# Patient Record
Sex: FEMALE | Race: WHITE | NOT HISPANIC OR LATINO | URBAN - METROPOLITAN AREA
[De-identification: names, ages, dates, MRNs, and addresses within clinical notes are randomized per-mention and may not be internally consistent; named-entity substitution may affect disease eponyms.]

---

## 2022-06-22 ENCOUNTER — OFFICE VISIT (OUTPATIENT)
Dept: URBAN - METROPOLITAN AREA CLINIC 98 | Facility: CLINIC | Age: 57
End: 2022-06-22
Payer: MEDICARE

## 2022-06-22 ENCOUNTER — WEB ENCOUNTER (OUTPATIENT)
Dept: URBAN - METROPOLITAN AREA CLINIC 98 | Facility: CLINIC | Age: 57
End: 2022-06-22

## 2022-06-22 VITALS
BODY MASS INDEX: 35.17 KG/M2 | HEIGHT: 64 IN | TEMPERATURE: 97.8 F | SYSTOLIC BLOOD PRESSURE: 155 MMHG | DIASTOLIC BLOOD PRESSURE: 83 MMHG | WEIGHT: 206 LBS | HEART RATE: 62 BPM

## 2022-06-22 DIAGNOSIS — K21.9 GERD WITHOUT ESOPHAGITIS: ICD-10-CM

## 2022-06-22 DIAGNOSIS — E66.9 OBESITY (BMI 30-39.9): ICD-10-CM

## 2022-06-22 DIAGNOSIS — R10.32 LLQ ABDOMINAL PAIN: ICD-10-CM

## 2022-06-22 PROCEDURE — 99203 OFFICE O/P NEW LOW 30 MIN: CPT | Performed by: INTERNAL MEDICINE

## 2022-06-22 PROCEDURE — 99243 OFF/OP CNSLTJ NEW/EST LOW 30: CPT | Performed by: INTERNAL MEDICINE

## 2022-06-22 RX ORDER — CARVEDILOL 25 MG/1
TABLET, FILM COATED ORAL
Qty: 0 | Refills: 0 | Status: ACTIVE | COMMUNITY
Start: 2016-07-25

## 2022-06-22 RX ORDER — ESTRADIOL 1 MG/1
TABLET ORAL
Qty: 0 | Refills: 0 | Status: ON HOLD | COMMUNITY
Start: 2016-08-04

## 2022-06-22 RX ORDER — HYDROCODONE BITARTRATE AND ACETAMINOPHEN 5; 325 MG/1; MG/1
TABLET ORAL
Qty: 0 | Refills: 0 | Status: ON HOLD | COMMUNITY
Start: 2016-08-30

## 2022-06-22 RX ORDER — DICYCLOMINE HYDROCHLORIDE 20 MG/1
1 TABLET TABLET ORAL TWICE A DAY
Qty: 30 | Refills: 3 | OUTPATIENT
Start: 2022-06-22 | End: 2022-08-17

## 2022-06-22 RX ORDER — LEVOTHYROXINE SODIUM 150 UG/1
TABLET ORAL
Qty: 0 | Refills: 0 | Status: ACTIVE | COMMUNITY
Start: 2016-07-25

## 2022-06-22 RX ORDER — PROMETHAZINE HYDROCHLORIDE 25 MG/1
TABLET ORAL
Qty: 0 | Refills: 0 | Status: ON HOLD | COMMUNITY
Start: 2016-08-30

## 2022-06-22 RX ORDER — AMOXICILLIN AND CLAVULANATE POTASSIUM 875; 125 MG/1; MG/1
1 TABLET TABLET, FILM COATED ORAL
Qty: 20 | Refills: 0 | OUTPATIENT
Start: 2022-06-22 | End: 2022-07-02

## 2022-06-22 RX ORDER — MIRTAZAPINE 15 MG/1
TABLET, FILM COATED ORAL
Qty: 0 | Refills: 0 | Status: ON HOLD | COMMUNITY
Start: 2016-08-21

## 2022-06-22 NOTE — HPI-TODAY'S VISIT:
Patient referred by Kraig Larson MD for evaluation of abdominal pain. Copy of this consult sent to Dr. Larson. 56 yo pt w 3 days of constant L-sided abdominal pain w radiation to the back, w alternating loose, non-bloody stools and constipation,wo fever, chills, night sweats and no melenic stools nor hematochezia. Pain is worst @ night and exacerbated by eating, unrelated to the type of food she eats. She had a formed  and small bm yesterday wo blood and some mucus. Dernies cardiorespiratory sxs. No JAD sxs. Had diverticulitis 5 yrs ago. Has known nephrolithiasis, s/p bilateral ESWL 2 mos ago. Colonoscopy 2024: normal anastomosis,  sigmoid diverticulosis and E - Hrrds.  No COVID-19 exposure and has received COVID-19 vaccine + booster. No other complaints.

## 2022-06-23 ENCOUNTER — TELEPHONE ENCOUNTER (OUTPATIENT)
Dept: URBAN - METROPOLITAN AREA CLINIC 98 | Facility: CLINIC | Age: 57
End: 2022-06-23

## 2022-06-26 PROBLEM — 266435005: Status: ACTIVE | Noted: 2022-06-22

## 2022-06-26 PROBLEM — 162864005: Status: ACTIVE | Noted: 2022-06-22

## 2022-07-01 ENCOUNTER — DASHBOARD ENCOUNTERS (OUTPATIENT)
Age: 57
End: 2022-07-01

## 2022-07-01 ENCOUNTER — OFFICE VISIT (OUTPATIENT)
Dept: URBAN - METROPOLITAN AREA TELEHEALTH 2 | Facility: TELEHEALTH | Age: 57
End: 2022-07-01
Payer: MEDICARE

## 2022-07-01 DIAGNOSIS — K52.9 CHRONIC DIARRHEA: ICD-10-CM

## 2022-07-01 DIAGNOSIS — E66.9 OBESITY (BMI 30.0-34.9): ICD-10-CM

## 2022-07-01 DIAGNOSIS — R10.32 LLQ ABDOMINAL PAIN: ICD-10-CM

## 2022-07-01 PROBLEM — 443371000124107: Status: ACTIVE | Noted: 2022-07-01

## 2022-07-01 PROCEDURE — 99214 OFFICE O/P EST MOD 30 MIN: CPT | Performed by: INTERNAL MEDICINE

## 2022-07-01 RX ORDER — AMOXICILLIN AND CLAVULANATE POTASSIUM 875; 125 MG/1; MG/1
1 TABLET TABLET, FILM COATED ORAL
Qty: 20 | Refills: 0 | Status: ACTIVE | COMMUNITY
Start: 2022-06-22 | End: 2022-07-02

## 2022-07-01 RX ORDER — HYDROCODONE BITARTRATE AND ACETAMINOPHEN 5; 325 MG/1; MG/1
TABLET ORAL
Qty: 0 | Refills: 0 | Status: ON HOLD | COMMUNITY
Start: 2016-08-30

## 2022-07-01 RX ORDER — PROMETHAZINE HYDROCHLORIDE 25 MG/1
TABLET ORAL
Qty: 0 | Refills: 0 | Status: ON HOLD | COMMUNITY
Start: 2016-08-30

## 2022-07-01 RX ORDER — LEVOTHYROXINE SODIUM 150 UG/1
TABLET ORAL
Qty: 0 | Refills: 0 | Status: ACTIVE | COMMUNITY
Start: 2016-07-25

## 2022-07-01 RX ORDER — DICYCLOMINE HYDROCHLORIDE 20 MG/1
1 TABLET TABLET ORAL TWICE A DAY
Qty: 30 | Refills: 3 | Status: ACTIVE | COMMUNITY
Start: 2022-06-22 | End: 2022-08-17

## 2022-07-01 RX ORDER — CARVEDILOL 25 MG/1
TABLET, FILM COATED ORAL
Qty: 0 | Refills: 0 | Status: ACTIVE | COMMUNITY
Start: 2016-07-25

## 2022-07-01 RX ORDER — MIRTAZAPINE 15 MG/1
TABLET, FILM COATED ORAL
Qty: 0 | Refills: 0 | Status: ON HOLD | COMMUNITY
Start: 2016-08-21

## 2022-07-01 RX ORDER — ESTRADIOL 1 MG/1
TABLET ORAL
Qty: 0 | Refills: 0 | Status: ON HOLD | COMMUNITY
Start: 2016-08-04

## 2022-07-01 NOTE — HPI-TODAY'S VISIT:
Thisis a telephone OV to which patient has agreed to. Limitations of telehealth discussed; she understands and agrees to proceed. Patient's @ home during this virtual OV. 56 yo pt for abdominal pain follow  up.  Still w persistent and constant L-sided abdominal pain w radiation to the back, w alternating loose, non-bloody stools and constipation,wo fever, chills, night sweats and no melenic stools nor hematochezia.  Diarrhea seems to predominate : 2 bm's /d watery w mucus wo tenesmus, nor urgency. Has taken Imodium bid. Pain seems to be  worst @ night and exacerbated by eating, unrelated to the type of food she eats.  A + P CT scan: liver and renal cyst, s/p CCY, s/PTAH and normal sigmoid anastomosis wo acute findings. Dernies cardiorespiratory sxs. No JAD sxs. Had diverticulitis 5 yrs ago. Has known nephrolithiasis, s/p bilateral ESWL 2 mos ago. Colonoscopy 2024: normal anastomosis,  sigmoid diverticulosis and E - Hrrds.  No COVID-19 exposure and has received COVID-19 vaccine + booster. No other complaints. Labs not done.

## 2022-11-11 ENCOUNTER — OFFICE VISIT (OUTPATIENT)
Dept: URBAN - METROPOLITAN AREA CLINIC 98 | Facility: CLINIC | Age: 57
End: 2022-11-11

## 2023-04-24 ENCOUNTER — TELEPHONE ENCOUNTER (OUTPATIENT)
Dept: URBAN - METROPOLITAN AREA CLINIC 98 | Facility: CLINIC | Age: 58
End: 2023-04-24